# Patient Record
Sex: MALE | Race: BLACK OR AFRICAN AMERICAN | NOT HISPANIC OR LATINO | Employment: OTHER | URBAN - METROPOLITAN AREA
[De-identification: names, ages, dates, MRNs, and addresses within clinical notes are randomized per-mention and may not be internally consistent; named-entity substitution may affect disease eponyms.]

---

## 2020-06-29 ENCOUNTER — TELEPHONE (OUTPATIENT)
Dept: PULMONOLOGY | Facility: CLINIC | Age: 70
End: 2020-06-29

## 2020-06-29 NOTE — TELEPHONE ENCOUNTER
Patient scheduled for next available [facility] appointment as per below:    Future Appointments   Date Time Provider Department Center   9/2/2020 10:20 AM Noel [facility]in MD [facility]     Dr. Lema's staff will notify patient.

## 2020-06-29 NOTE — TELEPHONE ENCOUNTER
----- Message from Gita Olivas LPN sent at 6/29/2020  2:30 PM CDT -----    ----- Message -----  From: Wilson Valenzuela  Sent: 6/29/2020  10:25 AM CDT  To: Mahad BLANCHARD Staff    Type:  Sooner Apoointment Request    Caller is requesting a sooner appointment.  Caller declined first available appointment listed below.  Caller will not accept being placed on the waitlist and is requesting a message be sent to doctor.  Name of Caller: Dr johnson   When is the first available appointment? 09/02/2020  Symptoms: abnormal PFT   Would the patient rather a call back or a [facility]? Call   Best Call Back Number: [phone]  Additional Information: [phone] PT

## 2020-07-01 DIAGNOSIS — R94.2 ABNORMAL PFTS (PULMONARY FUNCTION TESTS): Primary | ICD-10-CM

## 2020-07-27 DIAGNOSIS — R94.2 ABNORMAL PFTS (PULMONARY FUNCTION TESTS): Primary | ICD-10-CM

## 2020-08-25 ENCOUNTER — TELEPHONE (OUTPATIENT)
Dept: PULMONOLOGY | Facility: CLINIC | Age: 70
End: 2020-08-25

## 2020-09-23 ENCOUNTER — TELEPHONE (OUTPATIENT)
Dept: PULMONOLOGY | Facility: CLINIC | Age: 70
End: 2020-09-23

## 2020-11-24 ENCOUNTER — HOSPITAL ENCOUNTER (OUTPATIENT)
Dept: RADIOLOGY | Facility: HOSPITAL | Age: 70
Discharge: HOME | End: 2020-11-24
Attending: INTERNAL MEDICINE
Payer: MEDICARE

## 2020-11-24 ENCOUNTER — CLINICAL SUPPORT (OUTPATIENT)
Dept: PULMONOLOGY | Facility: CLINIC | Age: 70
End: 2020-11-24
Payer: MEDICARE

## 2020-11-24 ENCOUNTER — OFFICE VISIT (OUTPATIENT)
Dept: PULMONOLOGY | Facility: CLINIC | Age: 70
End: 2020-11-24
Payer: MEDICARE

## 2020-11-24 VITALS
HEIGHT: 70 IN | HEIGHT: 70 IN | BODY MASS INDEX: 26.33 KG/M2 | DIASTOLIC BLOOD PRESSURE: 70 MMHG | SYSTOLIC BLOOD PRESSURE: 132 MMHG | WEIGHT: 183.88 LBS | BODY MASS INDEX: 26.33 KG/M2 | HEART RATE: 57 BPM | RESPIRATION RATE: 18 BRPM | WEIGHT: 183.88 LBS | OXYGEN SATURATION: 99 %

## 2020-11-24 DIAGNOSIS — R94.2 ABNORMAL PFTS (PULMONARY FUNCTION TESTS): ICD-10-CM

## 2020-11-24 DIAGNOSIS — G47.33 OSA (OBSTRUCTIVE SLEEP APNEA): Primary | ICD-10-CM

## 2020-11-24 PROBLEM — I77.9 BILATERAL CAROTID ARTERY DISEASE: Status: ACTIVE | Noted: 2017-10-17

## 2020-11-24 PROBLEM — M47.812 CERVICAL ARTHRITIS: Status: ACTIVE | Noted: 2019-04-09

## 2020-11-24 PROBLEM — K43.2 INCISIONAL HERNIA, WITHOUT OBSTRUCTION OR GANGRENE: Status: ACTIVE | Noted: 2017-07-11

## 2020-11-24 PROBLEM — G47.00 INSOMNIA: Status: ACTIVE | Noted: 2020-11-24

## 2020-11-24 PROBLEM — I20.89 STABLE ANGINA PECTORIS: Status: ACTIVE | Noted: 2017-10-17

## 2020-11-24 PROBLEM — M54.50 LOW BACK PAIN: Status: ACTIVE | Noted: 2020-11-24

## 2020-11-24 PROBLEM — E78.00 PURE HYPERCHOLESTEROLEMIA: Status: ACTIVE | Noted: 2020-11-24

## 2020-11-24 PROBLEM — I70.0 ATHEROSCLEROSIS OF AORTA: Status: ACTIVE | Noted: 2017-10-17

## 2020-11-24 PROBLEM — E55.9 VITAMIN D DEFICIENCY: Status: ACTIVE | Noted: 2019-02-01

## 2020-11-24 PROBLEM — I10 ESSENTIAL HYPERTENSION: Status: ACTIVE | Noted: 2020-11-24

## 2020-11-24 PROBLEM — E78.5 OTHER AND UNSPECIFIED HYPERLIPIDEMIA: Status: ACTIVE | Noted: 2020-11-24

## 2020-11-24 PROBLEM — K21.9 GASTROESOPHAGEAL REFLUX DISEASE WITHOUT ESOPHAGITIS: Status: ACTIVE | Noted: 2020-11-24

## 2020-11-24 PROCEDURE — 1159F MED LIST DOCD IN RCRD: CPT | Mod: S$GLB,,, | Performed by: INTERNAL MEDICINE

## 2020-11-24 PROCEDURE — 94618 PULMONARY STRESS TESTING: CPT | Mod: S$GLB,,, | Performed by: INTERNAL MEDICINE

## 2020-11-24 PROCEDURE — 99204 PR OFFICE/OUTPT VISIT, NEW, LEVL IV, 45-59 MIN: ICD-10-PCS | Mod: 25,S$GLB,, | Performed by: INTERNAL MEDICINE

## 2020-11-24 PROCEDURE — 94618 PULMONARY STRESS TESTING: ICD-10-PCS | Mod: S$GLB,,, | Performed by: INTERNAL MEDICINE

## 2020-11-24 PROCEDURE — 1101F PT FALLS ASSESS-DOCD LE1/YR: CPT | Mod: CPTII,S$GLB,, | Performed by: INTERNAL MEDICINE

## 2020-11-24 PROCEDURE — 99999 PR PBB SHADOW E&M-EST. PATIENT-LVL III: ICD-10-PCS | Mod: PBBFAC,,, | Performed by: INTERNAL MEDICINE

## 2020-11-24 PROCEDURE — 71046 X-RAY EXAM CHEST 2 VIEWS: CPT | Mod: TC

## 2020-11-24 PROCEDURE — 71046 X-RAY EXAM CHEST 2 VIEWS: CPT | Mod: 26,,, | Performed by: RADIOLOGY

## 2020-11-24 PROCEDURE — 3008F BODY MASS INDEX DOCD: CPT | Mod: CPTII,S$GLB,, | Performed by: INTERNAL MEDICINE

## 2020-11-24 PROCEDURE — 3288F FALL RISK ASSESSMENT DOCD: CPT | Mod: CPTII,S$GLB,, | Performed by: INTERNAL MEDICINE

## 2020-11-24 PROCEDURE — 1159F PR MEDICATION LIST DOCUMENTED IN MEDICAL RECORD: ICD-10-PCS | Mod: S$GLB,,, | Performed by: INTERNAL MEDICINE

## 2020-11-24 PROCEDURE — 99204 OFFICE O/P NEW MOD 45 MIN: CPT | Mod: 25,S$GLB,, | Performed by: INTERNAL MEDICINE

## 2020-11-24 PROCEDURE — 3008F PR BODY MASS INDEX (BMI) DOCUMENTED: ICD-10-PCS | Mod: CPTII,S$GLB,, | Performed by: INTERNAL MEDICINE

## 2020-11-24 PROCEDURE — 71046 XR CHEST PA AND LATERAL: ICD-10-PCS | Mod: 26,,, | Performed by: RADIOLOGY

## 2020-11-24 PROCEDURE — 99999 PR PBB SHADOW E&M-EST. PATIENT-LVL III: CPT | Mod: PBBFAC,,, | Performed by: INTERNAL MEDICINE

## 2020-11-24 PROCEDURE — 1101F PR PT FALLS ASSESS DOC 0-1 FALLS W/OUT INJ PAST YR: ICD-10-PCS | Mod: CPTII,S$GLB,, | Performed by: INTERNAL MEDICINE

## 2020-11-24 PROCEDURE — 3288F PR FALLS RISK ASSESSMENT DOCUMENTED: ICD-10-PCS | Mod: CPTII,S$GLB,, | Performed by: INTERNAL MEDICINE

## 2020-11-24 RX ORDER — POTASSIUM CHLORIDE 750 MG/1
10 TABLET, EXTENDED RELEASE ORAL
COMMUNITY

## 2020-11-24 RX ORDER — MINOXIDIL 10 MG/1
TABLET ORAL
COMMUNITY
Start: 2020-08-30

## 2020-11-24 RX ORDER — CLOPIDOGREL BISULFATE 75 MG/1
TABLET ORAL
COMMUNITY
Start: 2020-09-21

## 2020-11-24 NOTE — PROCEDURES
"[facility]  Six Minute Walk     SUMMARY     Ordering Provider: Dr. Tobin   Interpreting Provider: Dr. Tobin  Performing nurse/tech/RT: RT Henry  Diagnosis: (Abnormal PFTs)  Height: 5' 10.28" (178.5 cm)  Weight: 83.4 kg (183 lb 13.8 oz)  BMI (Calculated): 26.2   Patient Race:             Phase Oxygen Assessment Supplemental O2 Heart   Rate Blood Pressure Fredy Dyspnea Scale Rating   Resting 99 % Room Air 57 bpm 141/63 1   Exercise        Minute        1 99 % Room Air 89 bpm     2 98 % Room Air 91 bpm     3 98 % Room Air 89 bpm     4 99 % Room Air 89 bpm     5 99 % Room Air 92 bpm     6  99 % Room Air 96 bpm 132/70 3   Recovery        Minute        1 99 % Room Air 76 bpm     2 100 % Room Air 61 bpm     3 100 % Room Air 62 bpm     4 100 % Room Air 64 bpm 133/71 1     Six Minute Walk Summary  6MWT Status: completed without stopping  Patient Reported: Dyspnea     Interpretation:  Did the patient stop or pause?: No                                         Total Time Walked (Calculated): 360 seconds  Final Partial Lap Distance (feet): 175 feet  Total Distance Meters (Calculated): 480.06 meters  Predicted Distance Meters (Calculated): 534.02 meters  Percentage of Predicted (Calculated): 89.9  Peak VO2 (Calculated): 18.38  Mets: 5.25  Has The Patient Had a Previous Six Minute Walk Test?: No       Previous 6MWT Results  Has The Patient Had a Previous Six Minute Walk Test?: No      Interpretation:  Total distance walked in six minutes is normal indicating normal functional capacity. There was no significant oxygen desaturation. Clinical correlation suggested.    Noel Tobin MD    Mild exercise-induced hypoxemia described as an arterial oxygen saturation of 93-95% (or 3-4% less than at rest), moderate exercise-induced hypoxemia as 89-93%, and severe exercise induced hypoxemia as < 89% O2 saturation.  Medicare Criteria Comments:   When arterial oxygen saturation is at or below 88% during " exercise (severe exercise induced hypoxemia) then the patient falls under Medicare Group 1 criteria for supplemental oxygen.  Details about Medicare Group Criteria coverage can be found at http://www.cms.hhs.gov/manuals/downloads/

## 2020-11-24 NOTE — PROGRESS NOTES
Subjective:     Patient ID: Lucas Steinberg is a 70 y.o. male.    Chief Complaint:      HPI     Sleep Apnea  He presents for a sleep evaluation. He complains of snoring, periods of not breathing, decreased memory, decreased concentration, falling asleep while reading, watching television, feels sleepy during the day, take naps during the day.  Symptoms began 3 years ago, gradually worsening since that time.  He goes to sleep at 9 weekdays and  weekends. He awakens 3 am and then goes back to sleep and awakes 6:30 am weekdays and  weekends.Takes naps at 4 pm He falls asleep in 60 minutes.  Collar size na. He denies knees buckling with laughing, completely or partially paralyzed while falling asleep or waking up. Previous evaluation and treatment has included none.    Dyspnea  Patient complains of shortness of breath. Symptoms occur after more than one flight stairs. Symptoms began 1 year ago, gradually worsening since. Associated symptoms include  shortness of breath and nausea. He denies chest pain, located left chest. He does not have had recent travel. Weight has been stable. Symptoms are exacerbated by strenuous activity. Symptoms are alleviated by rest. 54 pack years of smoking  6 months ago - abnormal Pulmonary Function Studies   Concern over bradycardia    Past Medical History:   Diagnosis Date    Hypertension      Past Surgical History:   Procedure Laterality Date    COLON SURGERY       Review of patient's allergies indicates:  No Known Allergies  Current Outpatient Medications on File Prior to Visit   Medication Sig Dispense Refill    aspirin (ECOTRIN) 81 MG EC tablet Take 81 mg by mouth once daily.      butalbital-acetaminophen-caffeine -40 mg (FIORICET) -40 mg per tablet Take 1-2 tablets by mouth every 6 (six) hours as needed for Pain. 20 tablet 0    clopidogreL (PLAVIX) 75 mg tablet       hydrochlorothiazide (HYDRODIURIL) 25 MG tablet Take 25 mg by mouth once daily.      minoxidiL  (LONITEN) 10 MG Tab       MULTIVIT-IRON-MIN-FOLIC ACID 3,500-18-0.4 UNIT-MG-MG ORAL CHEW Take by mouth.      nebivolol (BYSTOLIC) 10 MG Tab Take 10 mg by mouth once daily.      potassium chloride (KLOR-CON) 10 MEQ TbSR Take 10 mEq by mouth.      rosuvastatin (CRESTOR) 10 MG tablet Take 10 mg by mouth once daily.       No current facility-administered medications on file prior to visit.      Social History     Socioeconomic History    Marital status:      Spouse name: Not on file    Number of children: Not on file    Years of education: Not on file    Highest education level: Not on file   Occupational History    Not on file   Social Needs    Financial resource strain: Not on file    Food insecurity     Worry: Not on file     Inability: Not on file    Transportation needs     Medical: Not on file     Non-medical: Not on file   Tobacco Use    Smoking status: Former Smoker     Packs/day: 1.50     Years: 36.00     Pack years: 54.00     Types: Cigarettes     Start date: 1966     Quit date: 2002     Years since quittin.9   Substance and Sexual Activity    Alcohol use: Yes     Comment: drinks 6 pack or more of corona everyday    Drug use: No    Sexual activity: Yes     Partners: Female   Lifestyle    Physical activity     Days per week: Not on file     Minutes per session: Not on file    Stress: Not on file   Relationships    Social connections     Talks on phone: Not on file     Gets together: Not on file     Attends [employer] service: Not on file     Active member of club or organization: Not on file     Attends meetings of clubs or organizations: Not on file     Relationship status: Not on file   Other Topics Concern    Not on file   Social History Narrative    Not on file     No family history on file.    Review of Systems   Constitutional: Positive for fatigue.   HENT: Negative.    Respiratory: Positive for apnea and shortness of breath.    Cardiovascular: Negative.   "  Genitourinary: Negative.    Endocrine: endocrine negative   Musculoskeletal: Negative.    Skin: Negative.    Gastrointestinal: Negative.    Neurological: Negative.    Psychiatric/Behavioral: Positive for sleep disturbance.       Objective:      /70   Pulse (!) 57   Resp 18   Ht 5' 10.28" (1.785 m)   Wt 83.4 kg (183 lb 13.8 oz)   SpO2 99%   BMI 26.17 kg/m²   Physical Exam  Vitals signs and nursing note reviewed.   Constitutional:       Appearance: He is well-developed.   HENT:      Head: Normocephalic and atraumatic.   Eyes:      Conjunctiva/sclera: Conjunctivae normal.      Pupils: Pupils are equal, round, and reactive to light.   Neck:      Musculoskeletal: Neck supple.      Thyroid: No thyromegaly.      Vascular: No JVD.      Trachea: No tracheal deviation.   Cardiovascular:      Rate and Rhythm: Normal rate and regular rhythm.      Heart sounds: Normal heart sounds.   Pulmonary:      Effort: Pulmonary effort is normal.      Breath sounds: Normal breath sounds.   Abdominal:      Palpations: Abdomen is soft.   Musculoskeletal: Normal range of motion.   Lymphadenopathy:      Cervical: No cervical adenopathy.   Skin:     General: Skin is warm and dry.   Neurological:      Mental Status: He is alert and oriented to person, place, and time.       Personal Diagnostic Review  6 min walk - normal  CT Head Without Contrast  Narrative: COMPARISON: None    FINDINGS: No evidence of hemorrhage, mass, midline shift or acute major vascular territory infarct.  Gray white interface appears intact.  There is age appropriate minimal involutional change.  Mild degree of patchy and confluent hypoattenuation   throughout the subcortical and periventricular white matter, nonspecific but can be seen with chronic small vessel ischemic changes.  The ventricles are midline without distortion by mass effect.  No extra-axial hemorrhage or mass. Skull base vascular   calcifications are noted.    The extracranial structures are " unremarkable.  Calvarium is intact.  Visualized paranasal sinuses and mastoid air cells are clear.  Impression: 1.  No acute intracranial findings identified.    2.  Senescent changes as above.    Electronically signed by: GOLDY LIZ MD, MD  Date:     09/12/15  Time:    21:18   X-Ray Chest AP Portable  Narrative: COMPARISON: None    FINDINGS: Two AP portable upright views of the chest. EKG stickers overlie the chest.  Pulmonary vasculature and hilar regions are within normal limits. The bilateral lungs are well expanded and clear.  No large pleural effusion or pneumothorax.  The   heart and mediastinal contours are within normal limits for age.  Osseous structures appear intact.  Impression: No radiographic acute intrathoracic process seen on this single view.    Electronically signed by: GOLDY LIZ MD, MD  Date:     09/12/15  Time:    21:07         XR Chest 2 View PA and Lateral9/4/2019  [facility]  Result Impression     1. No significant detrimental change from prior study.  2. No appreciated acute disease.  3. Correlate for chronic lung disease.    Result Narrative   XR CHEST 2 VIEW PA AND LATERAL    Indication: [facility] Pacs merged reason for exam: R06.00:Dyspnea, unspecified    Additional information:      Comparison:January 23, 2000    Discussion:    The cardiomediastinal silhouette is within normal limits.  There is no evidence of pneumothorax.    Prominence of perihilar and interstitial pulmonary markings, unchanged significantly from prior study. Mild apical pleural thickening.    Mild thoracic spondylosis.   Other Result Information   Interface, Rad Results In - 09/04/2019  9:11 AM CDT  XR CHEST 2 VIEW PA AND LATERAL    Indication: [facility] Pacs merged reason for exam: R06.00:Dyspnea, unspecified    Additional information:      Comparison:January 23,  2000    Discussion:    The cardiomediastinal silhouette is within normal limits.  There is no evidence of pneumothorax.    Prominence of perihilar and interstitial pulmonary markings, unchanged significantly from prior study. Mild apical pleural thickening.    Mild thoracic spondylosis.      IMPRESSION:    1. No significant detrimental change from prior study.  2. No appreciated acute disease.  3. Correlate for chronic lung disease.    Status Results Details                CT Head Without Contrast  Narrative: COMPARISON: None    FINDINGS: No evidence of hemorrhage, mass, midline shift or acute major vascular territory infarct.  Gray white interface appears intact.  There is age appropriate minimal involutional change.  Mild degree of patchy and confluent hypoattenuation   throughout the subcortical and periventricular white matter, nonspecific but can be seen with chronic small vessel ischemic changes.  The ventricles are midline without distortion by mass effect.  No extra-axial hemorrhage or mass. Skull base vascular   calcifications are noted.    The extracranial structures are unremarkable.  Calvarium is intact.  Visualized paranasal sinuses and mastoid air cells are clear.  Impression: 1.  No acute intracranial findings identified.    2.  Senescent changes as above.    Electronically signed by: GOLDY LIZ MD, MD  Date:     09/12/15  Time:    21:18   X-Ray Chest AP Portable  Narrative: COMPARISON: None    FINDINGS: Two AP portable upright views of the chest. EKG stickers overlie the chest.  Pulmonary vasculature and hilar regions are within normal limits. The bilateral lungs are well expanded and clear.  No large pleural effusion or pneumothorax.  The   heart and mediastinal contours are within normal limits for age.  Osseous structures appear intact.  Impression: No radiographic acute intrathoracic process seen on this single view.    Electronically signed by: GOLDY LIZ MD, MD  Date:      09/12/15  Time:    21:07       Office Spirometry Results:     No flowsheet data found.  Pulmonary Studies Review 11/24/2020   SpO2 99   Ordering Provider -   Interpreting Provider -   Performing nurse/tech/RT -   Diagnosis -   Height 70.28   Weight 2941.82   BMI (Calculated) 26.2   Predicted Distance 340.34   Patient Race -   6MWT Status -   Patient Reported -   Was O2 used? -   6MW Distance walked (feet) -   Distance walked (meters) -   Did patient stop? -   Type of assistive device(s) used? -   Is extra documentation required for this patient? -   Oxygen Saturation -   Supplemental Oxygen -   Heart Rate -   Blood Pressure -   Fredy Dyspnea Rating  -   Oxygen Saturation -   Supplemental Oxygen -   Heart Rate -   Blood Pressure -   Fredy Dyspnea Rating  -   Recovery Time (seconds) -   Oxygen Saturation -   Supplemental Oxygen -   Heart Rate -   Blood Pressure -   Fredy Dyspnea Rating  -   Is procedure ready for interpretation? -   Did the patient stop or pause? -   Total Time Walked (Calculated) -   Total Laps Walked -   Final Partial Lap Distance (feet) -   Total Distance Feet (Calculated) -   Total Distance Meters (Calculated) -   Predicted Distance Meters (Calculated) 534.02   Percentage of Predicted (Calculated) -   Peak VO2 (Calculated) -   Mets -   Has The Patient Had a Previous Six Minute Walk Test? -   Oxygen Qualification? -         Assessment:            MIKE (obstructive sleep apnea)  -     Home Sleep Studies; Future; Expected date: 11/24/2020    Abnormal PFTs (pulmonary function tests)  -     Home Sleep Studies; Future; Expected date: 11/24/2020          Outpatient Encounter Medications as of 11/24/2020   Medication Sig Dispense Refill    aspirin (ECOTRIN) 81 MG EC tablet Take 81 mg by mouth once daily.      butalbital-acetaminophen-caffeine -40 mg (FIORICET) -40 mg per tablet Take 1-2 tablets by mouth every 6 (six) hours as needed for Pain. 20 tablet 0    clopidogreL (PLAVIX) 75 mg tablet        hydrochlorothiazide (HYDRODIURIL) 25 MG tablet Take 25 mg by mouth once daily.      minoxidiL (LONITEN) 10 MG Tab       MULTIVIT-IRON-MIN-FOLIC ACID 3,500-18-0.4 UNIT-MG-MG ORAL CHEW Take by mouth.      nebivolol (BYSTOLIC) 10 MG Tab Take 10 mg by mouth once daily.      potassium chloride (KLOR-CON) 10 MEQ TbSR Take 10 mEq by mouth.      rosuvastatin (CRESTOR) 10 MG tablet Take 10 mg by mouth once daily.       No facility-administered encounter medications on file as of 11/24/2020.      Plan:       Requested Prescriptions      No prescriptions requested or ordered in this encounter     Problem List Items Addressed This Visit     None      Visit Diagnoses     MIKE (obstructive sleep apnea)    -  Primary    Relevant Orders    Home Sleep Studies    Abnormal PFTs (pulmonary function tests)        Relevant Orders    Home Sleep Studies             Follow up in about 4 weeks (around 12/22/2020) for Review Sleep Study - on return.    MEDICAL DECISION MAKING: Moderate to high complexity.  Overall, the multiple problems listed are of moderate to high severity that may impact quality of life and activities of daily living. Side effects of medications, treatment plan as well as options and alternatives reviewed and discussed with patient. There was counseling of patient concerning these issues.    Total time spent in face to face counseling and coordination of care - 44  minutes over 50% of time was used in discussion of prognosis, risks, benefits of treatment, instructions and compliance with regimen . Discussion with other physicians or health care providers (DME, NP, pharmacy, respiratory therapy) occurred.

## 2020-11-24 NOTE — PATIENT INSTRUCTIONS
Please call the [facility] to schedule sleep study at  [phone] . Usually take 1- 2 days to get insurance company approval.  You will need to schedule at follow up clinic appointment 7 days after the sleep study to review the results.

## 2020-11-30 ENCOUNTER — TELEPHONE (OUTPATIENT)
Dept: PULMONOLOGY | Facility: HOSPITAL | Age: 70
End: 2020-11-30

## 2020-12-01 NOTE — TELEPHONE ENCOUNTER
----- Message from Shi Donohue sent at 11/30/2020  8:47 AM CST -----  [facility]

## 2020-12-16 ENCOUNTER — PROCEDURE VISIT (OUTPATIENT)
Dept: SLEEP MEDICINE | Facility: CLINIC | Age: 70
End: 2020-12-16
Payer: MEDICARE

## 2020-12-16 DIAGNOSIS — R94.2 ABNORMAL PFTS (PULMONARY FUNCTION TESTS): ICD-10-CM

## 2020-12-16 DIAGNOSIS — G47.33 OSA (OBSTRUCTIVE SLEEP APNEA): Primary | ICD-10-CM

## 2020-12-16 NOTE — Clinical Note
2 night study  MILD/BORDERLINE OBSTRUCTIVE SLEEP APNEA with overall AHI 5.6/hr ( 40 events): night #2  This is based on CMS 4% rule  Oxygen desaturation: 92%. SpO2 between 90% to 94% for 12 min.  Patient snored 86% time above 50 .  Heart rate range: 48 bpm - 74 bpm  REC's:  Therapy with APAP at 4-20 cm WP using mask of choice with heated humidification is an option

## 2020-12-17 PROCEDURE — 95806 PR SLEEP STUDY, UNATTENDED, SIMUL RECORD HR/O2 SAT/RESP FLOW/RESP EFFT: ICD-10-PCS | Mod: 26,S$GLB,, | Performed by: INTERNAL MEDICINE

## 2020-12-17 PROCEDURE — 95806 SLEEP STUDY UNATT&RESP EFFT: CPT | Mod: 26,S$GLB,, | Performed by: INTERNAL MEDICINE

## 2020-12-17 NOTE — PROCEDURES
Home Sleep Studies    Date/Time: 12/16/2020 8:15 AM  Performed by: Robin Fowler MD  Authorized by: Noel Tobin MD       2 night study  MILD/BORDERLINE OBSTRUCTIVE SLEEP APNEA with overall AHI 5.6/hr ( 40 events): night #2  This is based on CMS 4% rule  Oxygen desaturation: 92%. SpO2 between 90% to 94% for 12 min.  Patient snored 86% time above 50 .  Heart rate range: 48 bpm - 74 bpm  REC's:  Therapy with APAP at 4-20 cm WP using mask of choice with heated humidification is an option

## 2020-12-28 ENCOUNTER — OFFICE VISIT (OUTPATIENT)
Dept: PULMONOLOGY | Facility: CLINIC | Age: 70
End: 2020-12-28
Payer: MEDICARE

## 2020-12-28 VITALS
HEIGHT: 70 IN | SYSTOLIC BLOOD PRESSURE: 120 MMHG | DIASTOLIC BLOOD PRESSURE: 78 MMHG | WEIGHT: 185.63 LBS | RESPIRATION RATE: 18 BRPM | OXYGEN SATURATION: 99 % | HEART RATE: 63 BPM | BODY MASS INDEX: 26.57 KG/M2

## 2020-12-28 DIAGNOSIS — M19.90 ARTHRITIS: ICD-10-CM

## 2020-12-28 DIAGNOSIS — G47.33 OSA (OBSTRUCTIVE SLEEP APNEA): ICD-10-CM

## 2020-12-28 DIAGNOSIS — F51.04 PSYCHOPHYSIOLOGICAL INSOMNIA: Primary | ICD-10-CM

## 2020-12-28 PROCEDURE — 1101F PT FALLS ASSESS-DOCD LE1/YR: CPT | Mod: CPTII,S$GLB,, | Performed by: INTERNAL MEDICINE

## 2020-12-28 PROCEDURE — 3008F BODY MASS INDEX DOCD: CPT | Mod: CPTII,S$GLB,, | Performed by: INTERNAL MEDICINE

## 2020-12-28 PROCEDURE — 99215 OFFICE O/P EST HI 40 MIN: CPT | Mod: S$GLB,,, | Performed by: INTERNAL MEDICINE

## 2020-12-28 PROCEDURE — 99215 PR OFFICE/OUTPT VISIT, EST, LEVL V, 40-54 MIN: ICD-10-PCS | Mod: S$GLB,,, | Performed by: INTERNAL MEDICINE

## 2020-12-28 PROCEDURE — 3288F PR FALLS RISK ASSESSMENT DOCUMENTED: ICD-10-PCS | Mod: CPTII,S$GLB,, | Performed by: INTERNAL MEDICINE

## 2020-12-28 PROCEDURE — 1101F PR PT FALLS ASSESS DOC 0-1 FALLS W/OUT INJ PAST YR: ICD-10-PCS | Mod: CPTII,S$GLB,, | Performed by: INTERNAL MEDICINE

## 2020-12-28 PROCEDURE — 1159F PR MEDICATION LIST DOCUMENTED IN MEDICAL RECORD: ICD-10-PCS | Mod: S$GLB,,, | Performed by: INTERNAL MEDICINE

## 2020-12-28 PROCEDURE — 3288F FALL RISK ASSESSMENT DOCD: CPT | Mod: CPTII,S$GLB,, | Performed by: INTERNAL MEDICINE

## 2020-12-28 PROCEDURE — 99999 PR PBB SHADOW E&M-EST. PATIENT-LVL III: CPT | Mod: PBBFAC,,, | Performed by: INTERNAL MEDICINE

## 2020-12-28 PROCEDURE — 3008F PR BODY MASS INDEX (BMI) DOCUMENTED: ICD-10-PCS | Mod: CPTII,S$GLB,, | Performed by: INTERNAL MEDICINE

## 2020-12-28 PROCEDURE — 1159F MED LIST DOCD IN RCRD: CPT | Mod: S$GLB,,, | Performed by: INTERNAL MEDICINE

## 2020-12-28 PROCEDURE — 99999 PR PBB SHADOW E&M-EST. PATIENT-LVL III: ICD-10-PCS | Mod: PBBFAC,,, | Performed by: INTERNAL MEDICINE

## 2020-12-28 RX ORDER — DEXTROMETHORPHAN HYDROBROMIDE, GUAIFENESIN 5; 100 MG/5ML; MG/5ML
1300 LIQUID ORAL EVERY 8 HOURS
Qty: 500 TABLET
Start: 2020-12-28

## 2020-12-28 RX ORDER — TRAZODONE HYDROCHLORIDE 50 MG/1
50 TABLET ORAL NIGHTLY
Qty: 30 TABLET | Refills: 11 | Status: SHIPPED | OUTPATIENT
Start: 2020-12-28 | End: 2021-12-28

## 2020-12-28 NOTE — PATIENT INSTRUCTIONS
"Continuous Positive Airway Pressure Machine Cleaning    One of the most important factors in maintaining CPAP compliance is taking proper care of your CPAP equipment. In order to have successful CPAP therapy, you must be willing to make your treatment a priority in your life, and that means regularly cleaning and maintaining your CPAP equipment. Fortunately, taking proper care of your equipment is pretty easy, and not very time consuming. With a little adjustment to your regular morning routine, your device and accessories will be working at 100% efficiency to get you that much needed sleep you've been longing for.    One of the most frequent questions we get asked is "how often do I need to clean my CPAP equipment?" .    CPAP Humidifier Cleaning and Replacement:  Nearly all current CPAP machines now come with a heated humidification system that helps cut down on morning dry mouth as well as keeping your nasal turbinates from drying out and becoming irritated and inflamed. However, the humidification chamber needs to be cleaned out daily to prevent bacteria build-up as well as calcification. Recommended routine:    Remove chamber from humidifier carefully so water doesn't enter your CPAP machine.    Open chamber and wash with warm, soapy water.    Rinse well with water and allow to dry on a clean cloth or paper towel out in direct sunlight.    Fill with distilled or sterile water (obtained at any grocery store) Do not use tap water as it may contain minerals and chemicals that can damage components of the machine. It is also not recommended to use filtered water (i.e. through a [place] filter) for the same reasons.    Once a week the humidifier chamber should be soaked in a solution of 1 part white vinegar 3 parts water for approximately 15-20 minutes before rinsing thoroughly with distilled water.    Some humidifier chambers are  safe, but make sure to check your CPAP machine's manual before cleaning in a " .    Humidifier chambers should be replaced as needed.    CPAP Mask Cleaning and Replacement:  Most CPAP mask cushions are made of silicone, a gentle, non-irritating material. However, while silicone is a very comfortable material for masks, it doesn't have a very long lifespan, and without proper care can breakdown quicker than expected. The oil from your skin interacts with the silicone mask and causes breakdown and stiffness. Therefore, cleaning your CPAP mask is crucial in making it efficient as possible. Here's some tips on CPAP mask cleaning and replacement:    Wash mask daily with warm water and mild, non-fragrant soap or purchase CPAP mask specific wipes and detergents. You can use the same type of mild basic facial soap that you use on your face.    Rinse with water and allow to air dry on a clean cloth or paper towel out of direct sunlight.    Before using mask at night, wash your face thoroughly and don't use facial moisturizers. Facial oils and moisturizers can breakdown the silicone faster.    Once a week soak mask in solution of 1 part white vinegar 3 parts water before rinsing in distilled water.    Headgear and chinstraps should be washed as needed by hand using warm soapy water, rinsed well, and air dried. Do not place headgear or chinstraps in washing machine or dryer.  For replacement schedules of CPAP masks you should check both your 's recommendations and your insurance allowance. However, for most masks it is recommended that you  replace the cushions 1-2 times per month, and the mask every 3-6 months.    CPAP tubing should be cleaned weekly in a sink of warm, soapy water, rinsed well, and left to hang-dry out of direct sunlight.    CPAP Filters Cleaning and Replacement  Your filters are located near the back of the CPAP machine where the device draws air from the room that it compresses to your pressure settings. Nearly all CPAP machines have a disposable white paper  filter and some have an additional non-disposable grey filter as well. Here are some cleaning tips for your CPAP filters:    You should clean the grey non-disposable filter at least on a weekly basis. You may have to clean it more regularly if you have pets, smoke inside your house, or if your home is especially [place].    Rinse grey filters with water and allow to dry before placing back into your machine.    The grey re-usable filters should be replaced when it begins to look worn or after 6 months.    Replace disposable white paper filters monthly or more frequently if it appears dingy or dirty.    Your CPAP machine itself does not need to be cleaned but you may want to dust it down with a slightly damp cloth as desired.    General CPAP Maintenance & CPAP Cleaning Tips  Make your CPAP equipment cleaning part of your morning routine, allowing the equipment ample time to dry during the day.    Keep machine and accessories out of direct sunlight to avoid damaging them.    Never use bleach to clean accessories.    Other machine accessories such as power cords and data cards may need to be replaced due to equipment malfunctions.    Place machine on a level surface away from objects such as curtains that may interfere with the air intake.    Always use distilled or sterile water when cleaning components.    Keep track of when you should order replacement parts for your mask and accessories so that you always get the most out of your therapy.    With these simple tips on cleaning and maintaining your CPAP device and accessories, you will assuredly have a much better CPAP therapy experience.    There are a number of machines advertised that clean Continuous Positive Airway Pressure equipment. For most patients this is not necessary. If you have a history of chronic sinus or lung infections this type of cleaning device may be helpful. Unfortunately, at this time most insurance companies and Medicare are not paying for  these devices.    CPAP HABITUATION PROCEDURE    Bill Valladares, Ph.D., [facility] and Noel Tobin M.D.  [facility], [facility]    Some people have difficulty adjusting to CPAP/BiPAP/AutoCPAP.  This is not unusual or hard to understand: Breathing with CPAP is different from ordinary breathing, and this difference is aversive to some. The problem can be overcome, however, and the benefits CPAP confers are certainly worth the effort.  Below, you will find a simple and gradual way to get used to CPAP before you try to use it all night, every night.  The essence of this procedure is to relax and let breathing with CPAP become a habit.  It may take about 2 weeks, and involves the followin. CPAP while awake and comfortably seated, during the late evening.     2. CPAP in bed while attempting sleep at night.     3. If your discomfort is too great at any time, discontinue and attempt again later the same night, for the same amount of time.   4. You and your physician may alter the times and pressures if necessary.     5. If you find that it is very easy to get used to CPAP, you may start using it every night when you are comfortable enough to do so.  6. IMPORTANT REMINDER: If you have a cold or sinus congestion it is okay to miss a night or two of CPAP. Consider using antihistamines or decongestants to clear up your sinus congestion prior to sleeping.    DAYS  1-3   Start CPAP while awake and comfortably seated during the late evening, after having prepared for bed.  You may do this while watching television, listening to music or reading. Use for 1 hour, then take off CPAP and go directly to bed to sleep    DAYS  4-6   Start CPAP when you go to bed and use for 1 hour, or until you fall asleep.  If your discomfort is too great at any time, discontinue and attempt again later the same night, for the same designated amount of time (1 hour).     DAYS  7-9   Increase time with CPAP  to 2 hours a night.  If your discomfort is too great at any time, discontinue and attempt again later the same night, for the same designated amount of time (2 hours).     DAYS 10-12  Increase time with CPAP to 3 hours a night. If your discomfort is too great at any time, discontinue and attempt again later the same night, for the same designated amount of time (3 hours).     DAYS 13-15   Sleep the entire night with CPAP.     OPTIONAL: You may use Progressive Muscle Relaxation (PMR) to help put you at ease when using CPAP; do PMR twice each day, once in the morning or afternoon, and once in the evening just before using CPAP. You may do PMR prior to any attempt until you are comfortable with CPAP.

## 2020-12-28 NOTE — PROGRESS NOTES
Subjective:     Patient ID: Lucas Steinberg is a 70 y.o. male.    Chief Complaint:      HPI     Sleep Apnea  He presents for a sleep evaluation. He complains of snoring, periods of not breathing, decreased memory, decreased concentration, falling asleep while reading, watching television, feels sleepy during the day, take naps during the day.  Symptoms began 3 years ago, gradually worsening since that time.  He goes to sleep at 9 weekdays and  weekends. He awakens 3 am and then goes back to sleep and awakes 6:30 am weekdays and  weekends.Takes naps at 4 pm He falls asleep in 60 minutes.  Collar size na. He denies knees buckling with laughing, completely or partially paralyzed while falling asleep or waking up. Previous evaluation and treatment has home PSG     Dyspnea  Patient complains of shortness of breath. Symptoms occur after more than one flight stairs. Symptoms began 1 year ago, gradually worsening since. Associated symptoms include  shortness of breath and nausea. He denies chest pain, located left chest. He does not have had recent travel. Weight has been stable. Symptoms are exacerbated by strenuous activity. Symptoms are alleviated by rest. 54 pack years of smoking  6 months ago - abnormal Pulmonary Function Studies   Concern over bradycardia      Insomnia  Awakens at 3 am due to pain and thinking - stress  Shoulder - bursitis  Back discomfort  Problems with comfortable position   Takes aleve occasionally  Insomnia:  complains of insomnia. Onset was 2 years ago after mother . Patient describes symptoms as early morning awakening, difficulty falling asleep and non-restful sleep. Patient has found minimal relief with tylenol pm. Associated symptoms include: stress. Patient denies anxiety. Symptoms have gradually worsened.  Treatments tried in the past: advil pm and melatonin      Past Medical History:   Diagnosis Date    Hypertension      Past Surgical History:   Procedure Laterality Date    COLON  SURGERY       Review of patient's allergies indicates:  No Known Allergies  Current Outpatient Medications on File Prior to Visit   Medication Sig Dispense Refill    aspirin (ECOTRIN) 81 MG EC tablet Take 81 mg by mouth once daily.      clopidogreL (PLAVIX) 75 mg tablet       hydrochlorothiazide (HYDRODIURIL) 25 MG tablet Take 25 mg by mouth once daily.      minoxidiL (LONITEN) 10 MG Tab       MULTIVIT-IRON-MIN-FOLIC ACID 3,500-18-0.4 UNIT-MG-MG ORAL CHEW Take by mouth.      nebivolol (BYSTOLIC) 10 MG Tab Take 10 mg by mouth once daily.      potassium chloride (KLOR-CON) 10 MEQ TbSR Take 10 mEq by mouth.      rosuvastatin (CRESTOR) 10 MG tablet Take 10 mg by mouth once daily.      [DISCONTINUED] butalbital-acetaminophen-caffeine -40 mg (FIORICET) -40 mg per tablet Take 1-2 tablets by mouth every 6 (six) hours as needed for Pain. 20 tablet 0     No current facility-administered medications on file prior to visit.      Social History     Socioeconomic History    Marital status:      Spouse name: Not on file    Number of children: Not on file    Years of education: Not on file    Highest education level: Not on file   Occupational History    Not on file   Social Needs    Financial resource strain: Not on file    Food insecurity     Worry: Not on file     Inability: Not on file    Transportation needs     Medical: Not on file     Non-medical: Not on file   Tobacco Use    Smoking status: Former Smoker     Packs/day: 1.50     Years: 36.00     Pack years: 54.00     Types: Cigarettes     Start date: 1966     Quit date: 2002     Years since quittin.0   Substance and Sexual Activity    Alcohol use: Yes     Comment: drinks 6 pack or more of corona everyday    Drug use: No    Sexual activity: Yes     Partners: Female   Lifestyle    Physical activity     Days per week: Not on file     Minutes per session: Not on file    Stress: Not on file   Relationships    Social  "connections     Talks on phone: Not on file     Gets together: Not on file     Attends Sikhism service: Not on file     Active member of club or organization: Not on file     Attends meetings of clubs or organizations: Not on file     Relationship status: Not on file   Other Topics Concern    Not on file   Social History Narrative    Not on file     No family history on file.    Review of Systems   Constitutional: Positive for fatigue.   HENT: Negative.    Respiratory: Positive for apnea.    Cardiovascular: Negative.    Genitourinary: Negative.    Endocrine: endocrine negative   Musculoskeletal: Negative.    Skin: Negative.    Gastrointestinal: Negative.    Neurological: Negative.    Psychiatric/Behavioral: Positive for sleep disturbance.       Objective:      /78   Pulse 63   Resp 18   Ht 5' 10.28" (1.785 m)   Wt 84.2 kg (185 lb 10 oz)   SpO2 99%   BMI 26.42 kg/m²   Physical Exam  Vitals signs and nursing note reviewed.   Constitutional:       Appearance: He is well-developed.   HENT:      Head: Normocephalic and atraumatic.   Eyes:      Conjunctiva/sclera: Conjunctivae normal.      Pupils: Pupils are equal, round, and reactive to light.   Neck:      Musculoskeletal: Neck supple.      Thyroid: No thyromegaly.      Vascular: No JVD.      Trachea: No tracheal deviation.   Cardiovascular:      Rate and Rhythm: Normal rate and regular rhythm.      Heart sounds: Normal heart sounds.   Pulmonary:      Effort: Pulmonary effort is normal.      Breath sounds: Normal breath sounds.   Abdominal:      Palpations: Abdomen is soft.   Musculoskeletal: Normal range of motion.   Lymphadenopathy:      Cervical: No cervical adenopathy.   Skin:     General: Skin is warm and dry.   Neurological:      Mental Status: He is alert and oriented to person, place, and time.       Personal Diagnostic Review  PSG: significant for mild Obstructive Sleep Apnea      X-Ray Chest PA And Lateral  Narrative: EXAMINATION:  XR CHEST PA " AND LATERAL    CLINICAL HISTORY:  Abnormal results of pulmonary function studies    TECHNIQUE:  PA and lateral views of the chest were performed.    COMPARISON:  September 12, 2015    FINDINGS:  Allowing for positioning and technique there has been no significant change.  Smooth elevation right hemidiaphragm.  Bilateral irregular apical pleural thickening again noted.  Lungs otherwise clear without consolidation or effusion.  Heart and pulmonary vasculature within normal limits in the aorta minimally tortuous.  Osseous structures intact with mild degenerative change throughout the spine.  CP angles are clear and trachea is midline.  Impression: Stable exam without acute infiltrate.    Electronically signed by: Eduardo Pearson MD  Date:    11/24/2020  Time:    09:46      Office Spirometry Results:     Pulmonary Function Tests 12/28/2020   SpO2 99   Height 70.28   Weight 2970.04   BMI (Calculated) 26.4   Some recent data might be hidden     Pulmonary Studies Review 12/28/2020   SpO2 99   Ordering Provider -   Interpreting Provider -   Performing nurse/tech/RT -   Diagnosis -   Height 70.28   Weight 2970.04   BMI (Calculated) 26.4   Predicted Distance 339.22   Patient Race -   6MWT Status -   Patient Reported -   Was O2 used? -   6MW Distance walked (feet) -   Distance walked (meters) -   Did patient stop? -   Type of assistive device(s) used? -   Is extra documentation required for this patient? -   Oxygen Saturation -   Supplemental Oxygen -   Heart Rate -   Blood Pressure -   Fredy Dyspnea Rating  -   Oxygen Saturation -   Supplemental Oxygen -   Heart Rate -   Blood Pressure -   Fredy Dyspnea Rating  -   Recovery Time (seconds) -   Oxygen Saturation -   Supplemental Oxygen -   Heart Rate -   Blood Pressure -   Fredy Dyspnea Rating  -   Is procedure ready for interpretation? -   Did the patient stop or pause? -   Total Time Walked (Calculated) -   Total Laps Walked -   Final Partial Lap Distance (feet) -   Total Distance  Feet (Calculated) -   Total Distance Meters (Calculated) -   Predicted Distance Meters (Calculated) 532.61   Percentage of Predicted (Calculated) -   Peak VO2 (Calculated) -   Mets -   Has The Patient Had a Previous Six Minute Walk Test? -   Oxygen Qualification? -       Procedure visit    12/16/2020  [facility]   MIKE (obstructive sleep apnea) +1 more  Dx  Sleep Apnea ; Referred by Noel Tobin MD  Reason for Visit   Referring Provider    Noel Tobin MD          Additional Documentation    Encounter Info:    Billing Info,   History,   Detailed Report,   Education,   Care Plan,   Allergies,   Patient-Entered Questionnaires      Procedures  Robin Fowler MD (Physician)   Pulmonary Disease  Procedure Orders   1. Home Sleep Studies [[id number]] ordered by Noel Tobin MD   Pre-procedure Diagnoses   1. MIKE (obstructive sleep apnea) [G47.33]   2. Abnormal PFTs (pulmonary function tests) [R94.2]   Post-procedure Diagnoses   1. MIKE (obstructive sleep apnea) [G47.33]   2. Abnormal PFTs (pulmonary function tests) [R94.2]      Home Sleep Studies   Date/Time: 12/16/2020 8:15 AM  Performed by: Robin Fowler MD  Authorized by: Noel Tobin MD        2 night study  MILD/BORDERLINE OBSTRUCTIVE SLEEP APNEA with overall AHI 5.6/hr ( 40 events): night #2  This is based on CMS 4% rule  Oxygen desaturation: 92%. SpO2 between 90% to 94% for 12 min.  Patient snored 86% time above 50 .  Heart rate range: 48 bpm - 74 bpm  REC's:  Therapy with APAP at 4-20 cm WP using mask of choice with heated humidification is an option                Assessment:       MIKE (obstructive sleep apnea)  Auto Continuous Positive Airway Pressure ordered    Psychophysiological insomnia  -     traZODone (DESYREL) 50 MG tablet; Take 1 tablet (50 mg total) by mouth every evening.  Dispense: 30 tablet; Refill: 11    Arthritis  -     acetaminophen (TYLENOL ARTHRITIS PAIN) 650 MG TbSR; Take 2 tablets (1,300 mg total) by mouth  every 8 (eight) hours.  Dispense: 500 tablet; Refill: prn    MIKE (obstructive sleep apnea)  -     [facility] PATIENT ENTERED CPAP USAGE; Standing  -     CPAP/BIPAP SUPPLIES  -     CPAP FOR HOME USE          Outpatient Encounter Medications as of 12/28/2020   Medication Sig Dispense Refill    aspirin (ECOTRIN) 81 MG EC tablet Take 81 mg by mouth once daily.      clopidogreL (PLAVIX) 75 mg tablet       hydrochlorothiazide (HYDRODIURIL) 25 MG tablet Take 25 mg by mouth once daily.      minoxidiL (LONITEN) 10 MG Tab       MULTIVIT-IRON-MIN-FOLIC ACID 3,500-18-0.4 UNIT-MG-MG ORAL CHEW Take by mouth.      nebivolol (BYSTOLIC) 10 MG Tab Take 10 mg by mouth once daily.      potassium chloride (KLOR-CON) 10 MEQ TbSR Take 10 mEq by mouth.      rosuvastatin (CRESTOR) 10 MG tablet Take 10 mg by mouth once daily.      [DISCONTINUED] butalbital-acetaminophen-caffeine -40 mg (FIORICET) -40 mg per tablet Take 1-2 tablets by mouth every 6 (six) hours as needed for Pain. 20 tablet 0    acetaminophen (TYLENOL ARTHRITIS PAIN) 650 MG TbSR Take 2 tablets (1,300 mg total) by mouth every 8 (eight) hours. 500 tablet prn    traZODone (DESYREL) 50 MG tablet Take 1 tablet (50 mg total) by mouth every evening. 30 tablet 11     No facility-administered encounter medications on file as of 12/28/2020.      Plan:       Requested Prescriptions     Signed Prescriptions Disp Refills    traZODone (DESYREL) 50 MG tablet 30 tablet 11     Sig: Take 1 tablet (50 mg total) by mouth every evening.    acetaminophen (TYLENOL ARTHRITIS PAIN) 650 MG TbSR 500 tablet prn     Sig: Take 2 tablets (1,300 mg total) by mouth every 8 (eight) hours.     Problem List Items Addressed This Visit     Insomnia - Primary    Relevant Medications    traZODone (DESYREL) 50 MG tablet    MIKE (obstructive sleep apnea)    Current Assessment & Plan     Auto Continuous Positive Airway Pressure ordered         Relevant Orders    [facility] PATIENT ENTERED CPAP  USAGE    CPAP/BIPAP SUPPLIES    CPAP FOR HOME USE      Other Visit Diagnoses     Arthritis        Relevant Medications    acetaminophen (TYLENOL ARTHRITIS PAIN) 650 MG TbSR             Follow up in about 7 weeks (around 2/15/2021) for CPAP download - review on day of visit.    MEDICAL DECISION MAKING: Moderate to high complexity.  Overall, the multiple problems listed are of moderate to high severity that may impact quality of life and activities of daily living. Side effects of medications, treatment plan as well as options and alternatives reviewed and discussed with patient. There was counseling of patient concerning these issues.    Total time spent in face to face counseling and coordination of care - 40  minutes over 50% of time was used in discussion of prognosis, risks, benefits of treatment, instructions and compliance with regimen . Discussion with other physicians or health care providers (DME, NP, pharmacy, respiratory therapy) occurred.

## 2021-02-17 ENCOUNTER — TELEPHONE (OUTPATIENT)
Dept: PULMONOLOGY | Facility: CLINIC | Age: 71
End: 2021-02-17

## 2021-02-18 ENCOUNTER — OFFICE VISIT (OUTPATIENT)
Dept: PULMONOLOGY | Facility: CLINIC | Age: 71
End: 2021-02-18
Payer: MEDICARE

## 2021-02-18 VITALS
BODY MASS INDEX: 26.26 KG/M2 | HEIGHT: 70 IN | DIASTOLIC BLOOD PRESSURE: 60 MMHG | HEART RATE: 62 BPM | OXYGEN SATURATION: 97 % | RESPIRATION RATE: 20 BRPM | SYSTOLIC BLOOD PRESSURE: 132 MMHG | WEIGHT: 183.44 LBS

## 2021-02-18 DIAGNOSIS — G47.33 OSA (OBSTRUCTIVE SLEEP APNEA): ICD-10-CM

## 2021-02-18 DIAGNOSIS — G47.33 OSA ON CPAP: Primary | ICD-10-CM

## 2021-02-18 PROCEDURE — 3288F FALL RISK ASSESSMENT DOCD: CPT | Mod: CPTII,S$GLB,, | Performed by: INTERNAL MEDICINE

## 2021-02-18 PROCEDURE — 1159F MED LIST DOCD IN RCRD: CPT | Mod: S$GLB,,, | Performed by: INTERNAL MEDICINE

## 2021-02-18 PROCEDURE — 99214 OFFICE O/P EST MOD 30 MIN: CPT | Mod: S$GLB,,, | Performed by: INTERNAL MEDICINE

## 2021-02-18 PROCEDURE — 99999 PR PBB SHADOW E&M-EST. PATIENT-LVL III: ICD-10-PCS | Mod: PBBFAC,,, | Performed by: INTERNAL MEDICINE

## 2021-02-18 PROCEDURE — 3288F PR FALLS RISK ASSESSMENT DOCUMENTED: ICD-10-PCS | Mod: CPTII,S$GLB,, | Performed by: INTERNAL MEDICINE

## 2021-02-18 PROCEDURE — 3008F BODY MASS INDEX DOCD: CPT | Mod: CPTII,S$GLB,, | Performed by: INTERNAL MEDICINE

## 2021-02-18 PROCEDURE — 1101F PT FALLS ASSESS-DOCD LE1/YR: CPT | Mod: CPTII,S$GLB,, | Performed by: INTERNAL MEDICINE

## 2021-02-18 PROCEDURE — 1159F PR MEDICATION LIST DOCUMENTED IN MEDICAL RECORD: ICD-10-PCS | Mod: S$GLB,,, | Performed by: INTERNAL MEDICINE

## 2021-02-18 PROCEDURE — 1101F PR PT FALLS ASSESS DOC 0-1 FALLS W/OUT INJ PAST YR: ICD-10-PCS | Mod: CPTII,S$GLB,, | Performed by: INTERNAL MEDICINE

## 2021-02-18 PROCEDURE — 99999 PR PBB SHADOW E&M-EST. PATIENT-LVL III: CPT | Mod: PBBFAC,,, | Performed by: INTERNAL MEDICINE

## 2021-02-18 PROCEDURE — 3008F PR BODY MASS INDEX (BMI) DOCUMENTED: ICD-10-PCS | Mod: CPTII,S$GLB,, | Performed by: INTERNAL MEDICINE

## 2021-02-18 PROCEDURE — 99214 PR OFFICE/OUTPT VISIT, EST, LEVL IV, 30-39 MIN: ICD-10-PCS | Mod: S$GLB,,, | Performed by: INTERNAL MEDICINE

## 2021-04-29 ENCOUNTER — PATIENT MESSAGE (OUTPATIENT)
Dept: RESEARCH | Facility: HOSPITAL | Age: 71
End: 2021-04-29

## 2023-04-20 ENCOUNTER — PATIENT MESSAGE (OUTPATIENT)
Dept: RESEARCH | Facility: HOSPITAL | Age: 73
End: 2023-04-20
Payer: MEDICARE

## 2023-07-07 ENCOUNTER — PATIENT MESSAGE (OUTPATIENT)
Dept: INFECTIOUS DISEASES | Facility: CLINIC | Age: 73
End: 2023-07-07
Payer: MEDICARE